# Patient Record
Sex: FEMALE
[De-identification: names, ages, dates, MRNs, and addresses within clinical notes are randomized per-mention and may not be internally consistent; named-entity substitution may affect disease eponyms.]

---

## 2019-02-27 ENCOUNTER — HOSPITAL ENCOUNTER (OUTPATIENT)
Dept: HOSPITAL 5 - SPVIMAG | Age: 44
Discharge: HOME | End: 2019-02-27
Attending: FAMILY MEDICINE
Payer: COMMERCIAL

## 2019-02-27 DIAGNOSIS — M19.011: ICD-10-CM

## 2019-02-27 DIAGNOSIS — M19.012: Primary | ICD-10-CM

## 2019-02-27 DIAGNOSIS — M79.602: ICD-10-CM

## 2019-02-27 DIAGNOSIS — M79.601: ICD-10-CM

## 2019-03-01 NOTE — XRAY REPORT
X-RAY HUMERUS BILATERAL 2 PLUS VIEWS: 02/27/19 



CLINICAL: Bilateral arm pain.



FINDINGS: Normal alignment at the shoulder and the elbow.  No fracture. 

 No bone lesion.  Normal soft tissues.  The glenohumeral joints and 

elbow joints are normal.  Normal soft tissues.



IMPRESSION: Normal study.

## 2019-03-01 NOTE — XRAY REPORT
XRAY BILATERAL SHOULDER THREE VIEWS EACH: 02/27/19 14:44:00



CLINICAL: Bilateral shoulder pain.



FINDINGS: 



Right: No fracture or dislocation.  Mild acromioclavicular joint 

arthritis and mild glenohumeral joint arthritis.  No bone lesions.  

Normal soft tissues.



Left: No fracture or dislocation. Mild acromioclavicular joint 

arthritis and mild glenohumeral joint arthritis.  No bone lesions. 

Normal soft tissues.



IMPRESSION: Mild bilateral osteoarthritis.